# Patient Record
Sex: MALE | Race: BLACK OR AFRICAN AMERICAN | URBAN - METROPOLITAN AREA
[De-identification: names, ages, dates, MRNs, and addresses within clinical notes are randomized per-mention and may not be internally consistent; named-entity substitution may affect disease eponyms.]

---

## 2017-08-01 ENCOUNTER — APPOINTMENT (RX ONLY)
Dept: URBAN - METROPOLITAN AREA CLINIC 75 | Facility: CLINIC | Age: 52
Setting detail: DERMATOLOGY
End: 2017-08-01

## 2017-08-01 VITALS — WEIGHT: 208 LBS | HEIGHT: 67 IN

## 2017-08-01 DIAGNOSIS — L81.4 OTHER MELANIN HYPERPIGMENTATION: ICD-10-CM

## 2017-08-01 DIAGNOSIS — L70.0 ACNE VULGARIS: ICD-10-CM

## 2017-08-01 PROBLEM — J45.909 UNSPECIFIED ASTHMA, UNCOMPLICATED: Status: ACTIVE | Noted: 2017-08-01

## 2017-08-01 PROBLEM — I10 ESSENTIAL (PRIMARY) HYPERTENSION: Status: ACTIVE | Noted: 2017-08-01

## 2017-08-01 PROCEDURE — ? COUNSELING

## 2017-08-01 PROCEDURE — 99212 OFFICE O/P EST SF 10 MIN: CPT

## 2017-08-01 PROCEDURE — ? IN-HOUSE DISPENSING PHARMACY

## 2017-08-01 ASSESSMENT — LOCATION SIMPLE DESCRIPTION DERM
LOCATION SIMPLE: RIGHT INFERIOR EYELID
LOCATION SIMPLE: LEFT CHEEK

## 2017-08-01 ASSESSMENT — LOCATION ZONE DERM
LOCATION ZONE: FACE
LOCATION ZONE: EYELID

## 2017-08-01 ASSESSMENT — LOCATION DETAILED DESCRIPTION DERM
LOCATION DETAILED: RIGHT LATERAL INFERIOR EYELID
LOCATION DETAILED: LEFT SUPERIOR MEDIAL MALAR CHEEK
LOCATION DETAILED: LEFT CENTRAL MALAR CHEEK

## 2017-08-01 NOTE — PROCEDURE: IN-HOUSE DISPENSING PHARMACY
Product 42 Unit Type: grams
Product 15 Refills: 6
Product 58 Unit Type: mg
Product 65 Price/Unit (In Dollars): 0
Product 31 Price/Unit (In Dollars): 40.00
Product 11 Application Directions: Apply to affected area two times a day.
Product 51 Amount/Unit (Numbers Only): 15
Name Of Product 3: Umang / Clind Combo - 465209
Product 14 Application Directions: Apply to affected area one to two times a day.
Product 14 Unit Type: cc's
Product 24 Price/Unit (In Dollars): 50.00
Product 11 Amount/Unit (Numbers Only): 60
Name Of Product 2: Tret 0.05% Cream - 995511
Name Of Product 42: Kojic Melasma Cream - 449928
Product 14 Amount/Unit (Numbers Only): 50
Product 31 Application Directions: Apply to affected area before moisturizer one time a day.
Product 6 Refills: 11
Product 12 Amount/Unit (Numbers Only): 30
Product 41 Application Directions: Apply to hyperpigmented area in the evening after moisturizer.
Name Of Product 1: Sod Sulfa Body Wash - 338951
Product 31 Refills: 11
Name Of Product 14: Clob 0.05% Dermatitis Topical Foam - 915730
Product 4 Amount/Unit (Numbers Only): 30
Product 8 Application Directions: Apply to affected area in the evening after moisturizer.  Avoid eyelids.
Product 42 Units Dispensed: 1
Product 16 Application Directions: apply to affected area bid
Product 7 Application Directions: Apply to affected area one time a day.
Name Of Product 31: Ivermec 1% / Met 1%/ Pot Azel Gel - 412489
Product 51 Application Directions: Apply to affected nails daily for 10 months.
Product 1 Amount/Unit (Numbers Only): 120
Product 21 Application Directions: Apply to affected area in the evening or every other evening.
Name Of Product 41: Hydroquin 6%/ Tret 0.05% Combo Cream - 474153
Detail Level: Zone
Product 7 Amount/Unit (Numbers Only): 60
Name Of Product 7: Sod Sulf 10% / Sulf 2%  cream- 689956
Name Of Product 16: Tacro 0.1% ointment-536649
Name Of Product 51: Terb 5%/ DMSO Anti- Fungal Nail Solution - 031204
Product 2 Application Directions: Apply to affected area in the evening after moisturizer.  Avoid eyelids.
Name Of Product 24: Triamcin 1% Ointment - 784022
Name Of Product 12: Iodoquin / Naman Combo - 863469
Product 5 Application Directions: Apply to affected area in evening after moisturizer. Avoid eyelids.
Name Of Product 21: Imiquim 5% / Levo 1% Gel - 924512
Send Charges To Patient Encounter: Yes
Name Of Product 13: Clob 0.05% Cream - 880951
Name Of Product 8: Taza 0.1% Cream - 449926
Product 1 Application Directions: Use as face or body wash daily.
Product 2 Amount/Unit (Numbers Only): 30
Name Of Product 15: Triam 0.1%/Calcip 0.005% Psoriasis Ointment- 261499
Name Of Product 4: Acne Gel w/ Dapsone 7.5% - 470656
Product 3 Application Directions: Apply to acne prone area after moisturizer one time a day.
Name Of Product 5: Clind / Tret Combo Cream - 232156
Name Of Product 6: Adap  0.3%/BPO Combo Cream - 341910
Name Of Product 35: Tacro 0.1% Ointment - 477353
Product 6 Application Directions: Apply to affected area after moisturizer one time a day.
Name Of Product 11: Clob 0.05% Solution - 085681

## 2023-12-12 ENCOUNTER — APPOINTMENT (RX ONLY)
Dept: URBAN - METROPOLITAN AREA CLINIC 77 | Facility: CLINIC | Age: 58
Setting detail: DERMATOLOGY
End: 2023-12-12

## 2023-12-12 DIAGNOSIS — R21 RASH AND OTHER NONSPECIFIC SKIN ERUPTION: ICD-10-CM | Status: INADEQUATELY CONTROLLED

## 2023-12-12 DIAGNOSIS — D49.2 NEOPLASM OF UNSPECIFIED BEHAVIOR OF BONE, SOFT TISSUE, AND SKIN: ICD-10-CM

## 2023-12-12 PROCEDURE — 99204 OFFICE O/P NEW MOD 45 MIN: CPT | Mod: 25

## 2023-12-12 PROCEDURE — ? COUNSELING

## 2023-12-12 PROCEDURE — ? CHRONOLOGY OF PRESENT ILLNESS

## 2023-12-12 PROCEDURE — ? PRESCRIPTION MEDICATION MANAGEMENT

## 2023-12-12 PROCEDURE — ? PRESCRIPTION

## 2023-12-12 PROCEDURE — ? BIOPSY BY PUNCH METHOD

## 2023-12-12 PROCEDURE — 11104 PUNCH BX SKIN SINGLE LESION: CPT

## 2023-12-12 RX ORDER — CLOBETASOL PROPIONATE 0.5 MG/G
THIN LAYER CREAM TOPICAL BID
Qty: 60 | Refills: 1 | Status: CANCELLED | COMMUNITY
Start: 2023-12-12

## 2023-12-12 RX ADMIN — CLOBETASOL PROPIONATE THIN LAYER: 0.5 CREAM TOPICAL at 00:00

## 2023-12-12 ASSESSMENT — LOCATION SIMPLE DESCRIPTION DERM
LOCATION SIMPLE: RIGHT BUTTOCK
LOCATION SIMPLE: LEFT BUTTOCK

## 2023-12-12 ASSESSMENT — LOCATION DETAILED DESCRIPTION DERM
LOCATION DETAILED: LEFT BUTTOCK
LOCATION DETAILED: RIGHT BUTTOCK

## 2023-12-12 ASSESSMENT — BSA RASH: BSA RASH: 5

## 2023-12-12 ASSESSMENT — LOCATION ZONE DERM: LOCATION ZONE: TRUNK

## 2023-12-12 NOTE — PROCEDURE: PRESCRIPTION MEDICATION MANAGEMENT
Initiate Treatment: clobetasol 0.05 % topical cream BID\\nSig: Apply thin layer to AA  BID x 2 weeks
Render In Strict Bullet Format?: No
Detail Level: Zone

## 2023-12-12 NOTE — PROCEDURE: CHRONOLOGY OF PRESENT ILLNESS
Chronology Of Present Illness: 12/12/23 Intensely itchy rash present to bilateral buttocks since May. Patient notes moving from [place] at that time. He denies any new meds, topicals, or exclusive clothing. On exam, multiple lichenified and excoriated papules and patches on buttocks. Patient notes scratching and picking at areas. He states he was given triamcinolone by another doctor and used it for 2 weeks straight with no relief. Recommend biopsy today and will treat with clobetasol x 2 weeks
Detail Level: Zone

## 2023-12-12 NOTE — PROCEDURE: BIOPSY BY PUNCH METHOD

## 2023-12-13 ENCOUNTER — RX ONLY (OUTPATIENT)
Age: 58
Setting detail: RX ONLY
End: 2023-12-13

## 2023-12-13 RX ORDER — CLOBETASOL PROPIONATE 0.5 MG/G
THIN LAYER CREAM TOPICAL BID
Qty: 60 | Refills: 1 | Status: ERX